# Patient Record
(demographics unavailable — no encounter records)

---

## 2025-04-10 NOTE — HISTORY OF PRESENT ILLNESS
[FreeTextEntry1] : Annual physical [de-identified] : 38-year-old female who presents for an initial visit/annual physical. She had a recent miscarriage when she was nearing her second trimester (2/27/2025) She has a history of seizures which were diagnosed in May 2023.  The last seizure was the day she gave birth to her son.  The first that she knows about was 1 week prior.  She is now on keppra 500 mg bid. Sees Neurology year. saw him recently. C section due to the seizure She has a very stressful job.  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 Lost 100 pounds on her own years ago. But gained it back, Recently restarted exercise in the morning Improving diet GYN 9/24

## 2025-04-10 NOTE — HEALTH RISK ASSESSMENT
[No] : In the past 12 months have you used drugs other than those required for medical reasons? No [No falls in past year] : Patient reported no falls in the past year [0] : 2) Feeling down, depressed, or hopeless: Not at all (0) [PHQ-2 Negative - No further assessment needed] : PHQ-2 Negative - No further assessment needed [Patient reported PAP Smear was normal] : Patient reported PAP Smear was normal [With Significant Other] : lives with significant other [Employed] : employed [Significant Other] : lives with significant other [# Of Children ___] : has [unfilled] children [Never] : Never [NO] : No [[id number]] : 0 [HIV test declined] : HIV test declined [Change in mental status noted] : No change in mental status noted [None] : None [Reports changes in hearing] : Reports no changes in hearing [Reports changes in vision] : Reports no changes in vision [Reports changes in dental health] : Reports no changes in dental health [PapSmearDate] : 09/24 [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

## 2025-04-10 NOTE — PHYSICAL EXAM
[No Acute Distress] : no acute distress [Well-Appearing] : well-appearing [Normal Sclera/Conjunctiva] : normal sclera/conjunctiva [PERRL] : pupils equal round and reactive to light [EOMI] : extraocular movements intact [Normal Outer Ear/Nose] : the outer ears and nose were normal in appearance [Normal Oropharynx] : the oropharynx was normal [Normal TMs] : both tympanic membranes were normal [No JVD] : no jugular venous distention [No Lymphadenopathy] : no lymphadenopathy [Supple] : supple [Thyroid Normal, No Nodules] : the thyroid was normal and there were no nodules present [No Respiratory Distress] : no respiratory distress  [No Accessory Muscle Use] : no accessory muscle use [Clear to Auscultation] : lungs were clear to auscultation bilaterally [Normal Rate] : normal rate  [Regular Rhythm] : with a regular rhythm [Normal S1, S2] : normal S1 and S2 [No Murmur] : no murmur heard [No Carotid Bruits] : no carotid bruits [No Varicosities] : no varicosities [Pedal Pulses Present] : the pedal pulses are present [No Edema] : there was no peripheral edema [No Extremity Clubbing/Cyanosis] : no extremity clubbing/cyanosis [Normal Appearance] : normal in appearance [No Nipple Discharge] : no nipple discharge [No Axillary Lymphadenopathy] : no axillary lymphadenopathy [Soft] : abdomen soft [Non Tender] : non-tender [Non-distended] : non-distended [No Masses] : no abdominal mass palpated [No HSM] : no HSM [Normal Bowel Sounds] : normal bowel sounds [Normal Posterior Cervical Nodes] : no posterior cervical lymphadenopathy [Normal Anterior Cervical Nodes] : no anterior cervical lymphadenopathy [No CVA Tenderness] : no CVA  tenderness [No Spinal Tenderness] : no spinal tenderness [No Joint Swelling] : no joint swelling [Grossly Normal Strength/Tone] : grossly normal strength/tone [No Rash] : no rash [Coordination Grossly Intact] : coordination grossly intact [No Focal Deficits] : no focal deficits [Normal Gait] : normal gait [Deep Tendon Reflexes (DTR)] : deep tendon reflexes were 2+ and symmetric [Normal Affect] : the affect was normal [Alert and Oriented x3] : oriented to person, place, and time [Normal Insight/Judgement] : insight and judgment were intact [de-identified] : overweight

## 2025-04-10 NOTE — PLAN
[FreeTextEntry1] : Overweight Work on weight with improved diet and regular exercise.  Diet should consist of lean proteins, whole grains, low fat dairy, fruits and vegetables and portion control. Avoid refined carbohydrates, refined sugars and processed foods. Avoid drinking calories.  She should aim for 30 minutes of exercise 5 days/week.  Seizure disorder Stable on Keppra Annual follow-up with neurology  Preconception COntinue folic acid   Health maintenance Depression screening negative Blood pressure is controlled Check labs today, including CBC, CMP, TSH, HbA1c, lipids. Blood collected in office.  GYN exam up-to-date Follow-up 1 year or as needed